# Patient Record
Sex: MALE | Race: WHITE
[De-identification: names, ages, dates, MRNs, and addresses within clinical notes are randomized per-mention and may not be internally consistent; named-entity substitution may affect disease eponyms.]

---

## 2019-10-26 ENCOUNTER — HOSPITAL ENCOUNTER (EMERGENCY)
Dept: HOSPITAL 97 - ER | Age: 24
Discharge: HOME | End: 2019-10-26
Payer: COMMERCIAL

## 2019-10-26 VITALS — DIASTOLIC BLOOD PRESSURE: 74 MMHG | SYSTOLIC BLOOD PRESSURE: 131 MMHG | OXYGEN SATURATION: 100 % | TEMPERATURE: 99.6 F

## 2019-10-26 DIAGNOSIS — K05.10: ICD-10-CM

## 2019-10-26 DIAGNOSIS — B37.9: ICD-10-CM

## 2019-10-26 DIAGNOSIS — J02.9: Primary | ICD-10-CM

## 2019-10-26 DIAGNOSIS — K05.6: ICD-10-CM

## 2019-10-26 PROCEDURE — 87070 CULTURE OTHR SPECIMN AEROBIC: CPT

## 2019-10-26 PROCEDURE — 99283 EMERGENCY DEPT VISIT LOW MDM: CPT

## 2019-10-26 PROCEDURE — 87081 CULTURE SCREEN ONLY: CPT

## 2019-10-26 NOTE — ER
Nurse's Notes                                                                                     

 [facility]                                                                 

Name: Kendell Glover                                                                                 

Age: 24 yrs                                                                                       

Sex: Male                                                                                         

: 1995                                                                                   

MRN: [id number]                                                                                   

Arrival Date: 10/26/2019                                                                          

Time: 16:52                                                                                       

Account#: [id number]                                                                            

Bed 14                                                                                            

Private MD:                                                                                       

Diagnosis: Acute pharyngitis;Gingivitis and periodontal diseases;Candidiasis                      

                                                                                                  

Presentation:                                                                                     

10/26                                                                                             

16:54 Presenting complaint: Patient states: fever, sore throat Wednesday, dx with strep but   sv  

      not tested; sent w/ Amox/clav (started Wed) and Thursday started having gum                 

      swelling/throat swelling/neck pain. Transition of care: patient was not received from       

      another setting of care. Onset of symptoms was 2019. Risk Assessment: Do        

      you want to hurt yourself or someone else? Patient reports no desire to harm self or        

      others. Care prior to arrival: Medication(s) given: Tylenol, taken \T\ 1300.                  

16:54 Method Of Arrival: Ambulatory                                                           sv  

16:54 Acuity: NATALIIA 3                                                                           sv  

17:05 Initial Sepsis Screen: Does the patient meet any 2 criteria? No. Patient's initial      em  

      sepsis screen is negative. Does the patient have a suspected source of infection? No.       

      Patient's initial sepsis screen is negative.                                                

                                                                                                  

Historical:                                                                                       

- Allergies:                                                                                      

16:56 No Known Allergies;                                                                     sv  

- PMHx:                                                                                           

16:56 None;                                                                                   sv  

- PSHx:                                                                                           

16:56 None;                                                                                   sv  

                                                                                                  

- Immunization history:: Adult Immunizations up to date.                                          

- Social history:: Smoking status: Patient/guardian denies using tobacco.                         

- Ebola Screening: : Patient negative for fever greater than or equal to 101.5 degrees            

  Fahrenheit, and additional compatible Ebola Virus Disease symptoms Patient denies               

  exposure to infectious person Patient denies travel to an Ebola-affected area in the            

  21 days before illness onset No symptoms or risks identified at this time.                      

                                                                                                  

                                                                                                  

Screenin:05 Abuse screen: Denies threats or abuse. Nutritional screening: No deficits noted.        em  

      Tuberculosis screening: No symptoms or risk factors identified. Fall Risk None              

      identified.                                                                                 

                                                                                                  

Assessment:                                                                                       

17:05 General: Appears in no apparent distress. uncomfortable, Behavior is calm, cooperative, em  

      Denies fever. Pain: Complains of pain in throat Pain currently is 10 out of 10 on a         

      pain scale. Neuro: Level of Consciousness is awake, alert, obeys commands, Oriented to      

      person, place, time, situation, Appropriate for age. Cardiovascular: Capillary refill <     

      3 seconds Patient's skin is warm and dry. Respiratory: Airway is patent Respiratory         

      effort is even, unlabored, Respiratory pattern is regular, symmetrical, Breath sounds       

      are clear bilaterally. Denies cough. EENT: Nares are clear Oral mucosa is moist. Throat     

      has patchy exudate swelling around gums noted. Reports pain when swallowing. Derm: Skin     

      is intact, is healthy with good turgor, Skin is pink, warm \T\ dry. Musculoskeletal:        

      Capillary refill < 3 seconds, Range of motion: intact in all extremities.                   

17:05 Reassessment: I agree with assessment completed by Tony Musa LVN .                   aa5 

17:40 Reassessment: refused IV and labs, provider notified.                                   em  

17:46 Reassessment:.                                                                          em  

                                                                                                  

Vital Signs:                                                                                      

16:56  / 74; Pulse 72; Resp 18; Temp 99.6(O); Pulse Ox 100% ; Weight 77.11 kg; Height 6 sv  

      ft. 2 in. (187.96 cm);                                                                      

16:56 Body Mass Index 21.83 (77.11 kg, 187.96 cm)                                             sv  

                                                                                                  

ED Course:                                                                                        

16:52 Patient arrived in ED.                                                                  mr  

16:56 Triage completed.                                                                       sv  

16:58 Arm band placed on.                                                                     sv  

17:05 Patient has correct armband on for positive identification. Bed in low position. Call   em  

      light in reach. Adult w/ patient.                                                           

17:08 Kraig Osorio, NP is PHCP.                                                           pm1 

17:08 Amado Goodwin MD is Attending Physician.                                                pm1 

17:10 Tony Musa LVN is Primary Nurse.                                                     em  

18:17 No provider procedures requiring assistance completed. Patient did not have IV access   em  

      during this emergency room visit.                                                           

                                                                                                  

Administered Medications:                                                                         

17:42 Drug: Ibuprofen 600 mg Route: PO;                                                       em  

18:33 Follow up: Response: No adverse reaction; Pain is decreased                             em  

17:43 Not Given (Patient Refused): NS 0.9% 1000 ml IV at 1000 ml once                         em  

                                                                                                  

                                                                                                  

Outcome:                                                                                          

18:08 Discharge ordered by MD.                                                                pm1 

18:17 Discharged to home ambulatory, with family.                                             em  

18:17 Condition: good                                                                             

18:17 Discharge instructions given to patient, family, Instructed on discharge instructions,      

      follow up and referral plans. medication usage, Demonstrated understanding of               

      instructions, follow-up care, medications, Prescriptions given X 2.                         

18:33 Patient left the ED.                                                                    em  

                                                                                                  

Signatures:                                                                                       

Anni Montes RN                    RN                                                      

Radha Maria                                 mr                                                   

Musa, Tony, LVN                       LVN  em                                                   

Shae Gramajo RN                     RN   aa5                                                  

Kraig Osorio, NP                    NP   pm1                                                  

                                                                                                  

Corrections: (The following items were deleted from the chart)                                    

16:57 16:54 Care prior to arrival: None. sv                                                   sv  

16:58 16:56 Temp 99.6F Oral; 77.11 kg; Height 6 ft. 2 in.; BMI: 21.8; sv                      sv  

16:58 16:56 Pulse 72bpm; Resp 18bpm; Pulse Ox 100%; Temp 99.6F Oral; 77.11 kg; Height 6 ft. 2 sv  

      in.; BMI: 21.8; sv                                                                          

                                                                                                  

**************************************************************************************************